# Patient Record
Sex: MALE | Race: WHITE | NOT HISPANIC OR LATINO | Employment: FULL TIME | URBAN - NONMETROPOLITAN AREA
[De-identification: names, ages, dates, MRNs, and addresses within clinical notes are randomized per-mention and may not be internally consistent; named-entity substitution may affect disease eponyms.]

---
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
## 2021-01-06 PROCEDURE — U0003 INFECTIOUS AGENT DETECTION BY NUCLEIC ACID (DNA OR RNA); SEVERE ACUTE RESPIRATORY SYNDROME CORONAVIRUS 2 (SARS-COV-2) (CORONAVIRUS DISEASE [COVID-19]), AMPLIFIED PROBE TECHNIQUE, MAKING USE OF HIGH THROUGHPUT TECHNOLOGIES AS DESCRIBED BY CMS-2020-01-R: HCPCS | Performed by: FAMILY MEDICINE

## 2021-02-24 ENCOUNTER — OFFICE VISIT (OUTPATIENT)
Dept: FAMILY MEDICINE CLINIC | Facility: CLINIC | Age: 21
End: 2021-02-24

## 2021-02-24 VITALS
BODY MASS INDEX: 22.27 KG/M2 | HEIGHT: 72 IN | SYSTOLIC BLOOD PRESSURE: 120 MMHG | DIASTOLIC BLOOD PRESSURE: 62 MMHG | WEIGHT: 164.4 LBS

## 2021-02-24 DIAGNOSIS — R10.32 GROIN PAIN, LEFT: Primary | ICD-10-CM

## 2021-02-24 DIAGNOSIS — N50.812 TESTICULAR PAIN, LEFT: ICD-10-CM

## 2021-02-24 DIAGNOSIS — G57.92 ILIOINGUINAL NEURALGIA OF LEFT SIDE: ICD-10-CM

## 2021-02-24 PROBLEM — M25.532 LEFT WRIST PAIN: Status: RESOLVED | Noted: 2020-05-28 | Resolved: 2021-02-24

## 2021-02-24 PROCEDURE — 99213 OFFICE O/P EST LOW 20 MIN: CPT | Performed by: FAMILY MEDICINE

## 2021-02-24 RX ORDER — TIZANIDINE HYDROCHLORIDE 2 MG/1
CAPSULE, GELATIN COATED ORAL
Qty: 60 CAPSULE | Refills: 5 | OUTPATIENT
Start: 2021-02-24 | End: 2022-08-29

## 2021-02-24 RX ORDER — AMITRIPTYLINE HYDROCHLORIDE 25 MG/1
TABLET, FILM COATED ORAL
Qty: 40 TABLET | Refills: 1 | Status: SHIPPED | OUTPATIENT
Start: 2021-02-24 | End: 2021-02-24

## 2021-02-24 RX ORDER — IBUPROFEN 200 MG
200 TABLET ORAL EVERY 6 HOURS PRN
COMMUNITY
End: 2021-02-24

## 2021-02-24 NOTE — PROGRESS NOTES
Subjective   Mario Griffin is a 21 y.o. male.  Requesting evaluation recurrent left testicular groin pain being on off now for several weeks to months worse last 3 to 4 days.  Does do lifting at work.  Pain going down his testicle comes and goes tried ibuprofen ice without relief.  No dysuria no urinary symptoms.  No history of surgery.    History of Present Illness   Groin Pain  The patient's primary symptoms include testicular pain. Pertinent negatives include no abdominal pain, chest pain, constipation, coughing, diarrhea, headaches, nausea, urgency or vomiting.       The following portions of the patient's history were reviewed and updated as appropriate: allergies, current medications, past family history, past medical history, past social history, past surgical history and problem list.    Review of Systems  Review of Systems   Constitutional: Negative for activity change, appetite change, fatigue and unexpected weight change.   HENT: Negative for trouble swallowing and voice change.    Eyes: Negative for redness and visual disturbance.   Respiratory: Negative for cough and wheezing.    Cardiovascular: Negative for chest pain and palpitations.   Gastrointestinal: Negative for abdominal pain, constipation, diarrhea, nausea and vomiting.   Genitourinary: Positive for testicular pain. Negative for urgency.   Musculoskeletal: Negative for joint swelling.   Neurological: Negative for syncope and headaches.   Hematological: Negative for adenopathy.   Psychiatric/Behavioral: Negative for sleep disturbance.       Objective   Physical Exam  Physical Exam  Constitutional:       Appearance: He is well-developed.   HENT:      Head: Normocephalic.   Eyes:      Pupils: Pupils are equal, round, and reactive to light.   Neck:      Musculoskeletal: Normal range of motion.      Thyroid: No thyromegaly.   Cardiovascular:      Rate and Rhythm: Normal rate.      Heart sounds: Normal heart sounds. No murmur. No friction rub. No  "gallop.    Abdominal:      General: There is no distension.      Palpations: Abdomen is soft. There is no mass.      Tenderness: There is no abdominal tenderness.   Genitourinary:     Penis: Normal and circumcised.       Comments: Bilateral mild inguinal ring tenderness.  No testicular masses the tail of the left epididymis is mildly tender but not inflamed or enlarged.  Mild tenderness left greater than right at the inguinal canal opening.  No hernia bilaterally.  Musculoskeletal: Normal range of motion.   Skin:     General: Skin is warm and dry.   Neurological:      Mental Status: He is alert and oriented to person, place, and time.      Deep Tendon Reflexes: Reflexes are normal and symmetric.   Psychiatric:         Attention and Perception: Attention normal.         Mood and Affect: Mood normal.      Comments: No distress           Visit Vitals  /62   Ht 182.9 cm (72\")   Wt 74.6 kg (164 lb 6.4 oz)   BMI 22.30 kg/m²     Body mass index is 22.3 kg/m².      Assessment/Plan   Diagnoses and all orders for this visit:    1. Groin pain, left (Primary)  -     amitriptyline (ELAVIL) 25 MG tablet; 1 nightly for 7 nights and 2 nightly till seen again  Dispense: 40 tablet; Refill: 1  -     US Scrotum & Testicles; Future    2. Ilioinguinal neuralgia of left side  -     amitriptyline (ELAVIL) 25 MG tablet; 1 nightly for 7 nights and 2 nightly till seen again  Dispense: 40 tablet; Refill: 1  -     US Scrotum & Testicles; Future    3. Testicular pain, left  -     amitriptyline (ELAVIL) 25 MG tablet; 1 nightly for 7 nights and 2 nightly till seen again  Dispense: 40 tablet; Refill: 1  -     US Scrotum & Testicles; Future    Suspect the above.  Counseled on lifting technique.  No lifting greater than 10 pounds for 2 weeks.  Given age and tenderness ultrasound.  Low-dose amitriptyline at night recheck 2 weeks  "

## 2021-03-02 ENCOUNTER — HOSPITAL ENCOUNTER (OUTPATIENT)
Dept: ULTRASOUND IMAGING | Facility: HOSPITAL | Age: 21
Discharge: HOME | End: 2021-03-02

## 2021-03-02 DIAGNOSIS — R10.32 GROIN PAIN, LEFT: ICD-10-CM

## 2021-03-02 DIAGNOSIS — G57.92 ILIOINGUINAL NEURALGIA OF LEFT SIDE: ICD-10-CM

## 2021-03-02 DIAGNOSIS — N50.812 TESTICULAR PAIN, LEFT: ICD-10-CM

## 2021-03-02 PROCEDURE — 76870 US EXAM SCROTUM: CPT

## 2021-03-02 PROCEDURE — 93976 VASCULAR STUDY: CPT

## 2021-03-08 ENCOUNTER — OFFICE VISIT (OUTPATIENT)
Dept: FAMILY MEDICINE CLINIC | Facility: CLINIC | Age: 21
End: 2021-03-08

## 2021-03-08 VITALS
BODY MASS INDEX: 22.6 KG/M2 | WEIGHT: 166.9 LBS | DIASTOLIC BLOOD PRESSURE: 64 MMHG | SYSTOLIC BLOOD PRESSURE: 118 MMHG | HEIGHT: 72 IN

## 2021-03-08 DIAGNOSIS — G57.92 ILIOINGUINAL NEURALGIA, LEFT: Primary | ICD-10-CM

## 2021-03-08 PROCEDURE — 99212 OFFICE O/P EST SF 10 MIN: CPT | Performed by: FAMILY MEDICINE

## 2021-03-08 NOTE — PROGRESS NOTES
"Shefali Griffin is a 21 y.o. male.  Reevaluation left ilioinguinal radiculopathy.  Combination of lifting restrictions heat short-term medication symptoms have abated.  Ultrasound showed no gross pathology.    History of Present Illness   HPI    The following portions of the patient's history were reviewed and updated as appropriate: allergies, current medications, past family history, past medical history, past social history, past surgical history and problem list.    Review of Systems  Review of Systems   Constitutional: Negative for activity change, appetite change, fatigue and unexpected weight change.   HENT: Negative for trouble swallowing and voice change.    Eyes: Negative for redness and visual disturbance.   Respiratory: Negative for cough and wheezing.    Cardiovascular: Negative for chest pain and palpitations.   Gastrointestinal: Negative for abdominal pain, constipation, diarrhea, nausea and vomiting.   Genitourinary: Negative for urgency.   Musculoskeletal: Negative for joint swelling.   Neurological: Negative for syncope and headaches.   Hematological: Negative for adenopathy.   Psychiatric/Behavioral: Negative for sleep disturbance.       Objective   Physical Exam  Physical Exam  Vitals reviewed.   Constitutional:       Appearance: Normal appearance. He is normal weight.   Musculoskeletal:      Comments: Go 3 to 5 seconds gait normal   Neurological:      Mental Status: He is alert.   Psychiatric:      Comments: No distress           Visit Vitals  /64   Ht 182.9 cm (72\")   Wt 75.7 kg (166 lb 14.4 oz)   BMI 22.64 kg/m²     Body mass index is 22.64 kg/m².      Assessment/Plan   Diagnoses and all orders for this visit:    1. Ilioinguinal neuralgia, left (Primary)    Counseled lifting technique return to work normal duty  "

## 2023-06-14 ENCOUNTER — OFFICE VISIT (OUTPATIENT)
Dept: FAMILY MEDICINE CLINIC | Facility: CLINIC | Age: 23
End: 2023-06-14
Payer: COMMERCIAL

## 2023-06-14 VITALS
OXYGEN SATURATION: 99 % | BODY MASS INDEX: 22.77 KG/M2 | HEIGHT: 72 IN | SYSTOLIC BLOOD PRESSURE: 140 MMHG | HEART RATE: 87 BPM | WEIGHT: 168.1 LBS | DIASTOLIC BLOOD PRESSURE: 78 MMHG

## 2023-06-14 DIAGNOSIS — R21 RASH: Primary | ICD-10-CM

## 2023-06-14 DIAGNOSIS — B35.4 TINEA CORPORIS: ICD-10-CM

## 2023-06-14 DIAGNOSIS — M54.6 CHRONIC RIGHT-SIDED THORACIC BACK PAIN: Chronic | ICD-10-CM

## 2023-06-14 DIAGNOSIS — G89.29 CHRONIC RIGHT-SIDED THORACIC BACK PAIN: Chronic | ICD-10-CM

## 2023-06-14 PROCEDURE — 99213 OFFICE O/P EST LOW 20 MIN: CPT | Performed by: FAMILY MEDICINE

## 2023-06-14 RX ORDER — CLOTRIMAZOLE AND BETAMETHASONE DIPROPIONATE 10; .64 MG/G; MG/G
CREAM TOPICAL
Qty: 45 G | Refills: 1 | Status: SHIPPED | OUTPATIENT
Start: 2023-06-14

## 2023-06-14 NOTE — PROGRESS NOTES
Answers for HPI/ROS submitted by the patient on 6/13/2023  What is the primary reason for your visit?: Rash  Chronicity: new  Onset: 1 to 4 weeks ago  Progression since onset: gradually worsening  Affected locations: right arm  Characteristics: burning, dryness, redness, swelling, itchiness  Exposed to: unknown  anorexia: No  congestion: No  cough: No  diarrhea: No  eye pain: No  facial edema: No  fatigue: No  fever: No  joint pain: Yes  nail changes: No  rhinorrhea: No  shortness of breath: No  sore throat: No  vomiting: No    Subjective   Mario Sriramkana Griffin is a 23 y.o. male.  Request evaluation rash right inner forearm.  Is been there for several weeks.  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  Itches and burns from time to time.  No other lesions.  Also can have intermittent back pain with counseled a mainly on yoga leif chi conservative therapy.    History of Present Illness   HPI    The following portions of the patient's history were reviewed and updated as appropriate: allergies, current medications, past family history, past medical history, past social history, past surgical history and problem list.    Review of Systems  Review of Systems   Constitutional: Negative for activity change, appetite change, fatigue, fever and unexpected weight change.   HENT: Negative for congestion, rhinorrhea, sore throat, trouble swallowing and voice change.    Eyes: Negative for pain, redness and visual disturbance.   Respiratory: Negative for cough, shortness of breath and wheezing.    Cardiovascular: Negative for chest pain and palpitations.   Gastrointestinal: Negative for abdominal pain, constipation, diarrhea, nausea and vomiting.   Genitourinary: Negative for urgency.   Musculoskeletal: Positive for back pain. Negative for joint swelling.   Skin: Positive for rash.   Neurological: Negative for syncope and headaches.   Hematological: Negative for adenopathy.   Psychiatric/Behavioral: Negative for  "sleep disturbance.       Objective   Physical Exam  Physical Exam  Constitutional:       Appearance: Normal appearance. He is obese.   Musculoskeletal:      Comments: Get up and go 3 to 5 seconds gait   Skin:     Comments: Right medial forearm shows a classic tenia corporis rash with clearing center circular about 3 to 3-1/2 cm.  Can be treated locally.  No other lesions seen   Neurological:      Mental Status: He is alert.   Psychiatric:         Attention and Perception: Attention normal.         Mood and Affect: Mood normal.           Visit Vitals  /78   Pulse 87   Ht 182.9 cm (72\")   Wt 76.2 kg (168 lb 1.6 oz)   SpO2 99%   BMI 22.80 kg/m²     Body mass index is 22.8 kg/m².    /78   Pulse 87   Ht 182.9 cm (72\")   Wt 76.2 kg (168 lb 1.6 oz)   SpO2 99%   BMI 22.80 kg/m²     Assessment/Plan   Diagnoses and all orders for this visit:    1. Rash (Primary)    2. Tinea corporis  -     clotrimazole-betamethasone (LOTRISONE) 1-0.05 % cream; Apply to affected area twice daily for 3 weeks  Dispense: 45 g; Refill: 1    3. Chronic right-sided thoracic back pain    Counseled on keeping area covered when around other people if need be counseled using medication for as long as it states counseled on disease process counseled on back care recheck as directed  "